# Patient Record
(demographics unavailable — no encounter records)

---

## 2025-05-12 NOTE — PLAN
[FreeTextEntry1] : chronic yeast inf- start diflucan monthly cultures sent.  check fah/est levels.  rto for us ,

## 2025-05-12 NOTE — HISTORY OF PRESENT ILLNESS
[FreeTextEntry1] : 46 yo pt  p3 (oldest first yr college [employer], others jr and freshman on metformin, has dm, metoprolol. hctz, amlodipine  has been having period for full 2 weeks. (first episode like this) poorly controlled dm, a1 c was 11, now 8+ denies other abnl vb, dyspareunia occ skips a periods. denies hot flashes, night sweats.  has been getting recurrent yeast infections , oft before periods.